# Patient Record
Sex: MALE | Race: WHITE | NOT HISPANIC OR LATINO | URBAN - METROPOLITAN AREA
[De-identification: names, ages, dates, MRNs, and addresses within clinical notes are randomized per-mention and may not be internally consistent; named-entity substitution may affect disease eponyms.]

---

## 2019-11-25 ENCOUNTER — INPATIENT (INPATIENT)
Facility: HOSPITAL | Age: 39
LOS: 0 days | Discharge: HOME | DRG: 343 | End: 2019-11-26
Attending: SURGERY | Admitting: SURGERY
Payer: COMMERCIAL

## 2019-11-25 VITALS
RESPIRATION RATE: 18 BRPM | WEIGHT: 149.91 LBS | OXYGEN SATURATION: 98 % | TEMPERATURE: 98 F | SYSTOLIC BLOOD PRESSURE: 135 MMHG | HEART RATE: 101 BPM | HEIGHT: 69 IN | DIASTOLIC BLOOD PRESSURE: 88 MMHG

## 2019-11-25 LAB
ALBUMIN SERPL ELPH-MCNC: 4.1 G/DL — SIGNIFICANT CHANGE UP (ref 3.4–5)
ALP SERPL-CCNC: 83 U/L — SIGNIFICANT CHANGE UP (ref 40–120)
ALT FLD-CCNC: 42 U/L — SIGNIFICANT CHANGE UP (ref 12–42)
ANION GAP SERPL CALC-SCNC: 10 MMOL/L — SIGNIFICANT CHANGE UP (ref 9–16)
APPEARANCE UR: CLEAR — SIGNIFICANT CHANGE UP
APTT BLD: 32.4 SEC — SIGNIFICANT CHANGE UP (ref 27.5–36.3)
AST SERPL-CCNC: 27 U/L — SIGNIFICANT CHANGE UP (ref 15–37)
BILIRUB SERPL-MCNC: 0.6 MG/DL — SIGNIFICANT CHANGE UP (ref 0.2–1.2)
BILIRUB UR-MCNC: NEGATIVE — SIGNIFICANT CHANGE UP
BUN SERPL-MCNC: 11 MG/DL — SIGNIFICANT CHANGE UP (ref 7–23)
CALCIUM SERPL-MCNC: 9.5 MG/DL — SIGNIFICANT CHANGE UP (ref 8.5–10.5)
CHLORIDE SERPL-SCNC: 106 MMOL/L — SIGNIFICANT CHANGE UP (ref 96–108)
CO2 SERPL-SCNC: 29 MMOL/L — SIGNIFICANT CHANGE UP (ref 22–31)
COLOR SPEC: YELLOW — SIGNIFICANT CHANGE UP
CREAT SERPL-MCNC: 0.81 MG/DL — SIGNIFICANT CHANGE UP (ref 0.5–1.3)
DIFF PNL FLD: NEGATIVE — SIGNIFICANT CHANGE UP
GLUCOSE SERPL-MCNC: 108 MG/DL — HIGH (ref 70–99)
GLUCOSE UR QL: NEGATIVE — SIGNIFICANT CHANGE UP
HCT VFR BLD CALC: 44.4 % — SIGNIFICANT CHANGE UP (ref 39–50)
HGB BLD-MCNC: 14 G/DL — SIGNIFICANT CHANGE UP (ref 13–17)
INR BLD: 1.06 — SIGNIFICANT CHANGE UP (ref 0.88–1.16)
KETONES UR-MCNC: NEGATIVE — SIGNIFICANT CHANGE UP
LEUKOCYTE ESTERASE UR-ACNC: NEGATIVE — SIGNIFICANT CHANGE UP
LIDOCAIN IGE QN: 128 U/L — SIGNIFICANT CHANGE UP (ref 73–393)
MCHC RBC-ENTMCNC: 26.3 PG — LOW (ref 27–34)
MCHC RBC-ENTMCNC: 31.5 GM/DL — LOW (ref 32–36)
MCV RBC AUTO: 83.5 FL — SIGNIFICANT CHANGE UP (ref 80–100)
NITRITE UR-MCNC: NEGATIVE — SIGNIFICANT CHANGE UP
NRBC # BLD: 0 /100 WBCS — SIGNIFICANT CHANGE UP (ref 0–0)
PH UR: 6 — SIGNIFICANT CHANGE UP (ref 5–8)
PLATELET # BLD AUTO: 214 K/UL — SIGNIFICANT CHANGE UP (ref 150–400)
POTASSIUM SERPL-MCNC: 3.9 MMOL/L — SIGNIFICANT CHANGE UP (ref 3.5–5.3)
POTASSIUM SERPL-SCNC: 3.9 MMOL/L — SIGNIFICANT CHANGE UP (ref 3.5–5.3)
PROT SERPL-MCNC: 7.5 G/DL — SIGNIFICANT CHANGE UP (ref 6.4–8.2)
PROT UR-MCNC: NEGATIVE MG/DL — SIGNIFICANT CHANGE UP
PROTHROM AB SERPL-ACNC: 11.7 SEC — SIGNIFICANT CHANGE UP (ref 10–12.9)
RBC # BLD: 5.32 M/UL — SIGNIFICANT CHANGE UP (ref 4.2–5.8)
RBC # FLD: 14.5 % — SIGNIFICANT CHANGE UP (ref 10.3–14.5)
SODIUM SERPL-SCNC: 145 MMOL/L — SIGNIFICANT CHANGE UP (ref 132–145)
SP GR SPEC: <=1.005 — SIGNIFICANT CHANGE UP (ref 1–1.03)
UROBILINOGEN FLD QL: 0.2 E.U./DL — SIGNIFICANT CHANGE UP
WBC # BLD: 7.29 K/UL — SIGNIFICANT CHANGE UP (ref 3.8–10.5)
WBC # FLD AUTO: 7.29 K/UL — SIGNIFICANT CHANGE UP (ref 3.8–10.5)

## 2019-11-25 PROCEDURE — 74177 CT ABD & PELVIS W/CONTRAST: CPT | Mod: 26

## 2019-11-25 PROCEDURE — 99285 EMERGENCY DEPT VISIT HI MDM: CPT

## 2019-11-25 RX ORDER — ACETAMINOPHEN 500 MG
650 TABLET ORAL ONCE
Refills: 0 | Status: COMPLETED | OUTPATIENT
Start: 2019-11-25 | End: 2019-11-25

## 2019-11-25 RX ORDER — SODIUM CHLORIDE 9 MG/ML
1000 INJECTION INTRAMUSCULAR; INTRAVENOUS; SUBCUTANEOUS ONCE
Refills: 0 | Status: COMPLETED | OUTPATIENT
Start: 2019-11-25 | End: 2019-11-25

## 2019-11-25 RX ORDER — IOHEXOL 300 MG/ML
30 INJECTION, SOLUTION INTRAVENOUS ONCE
Refills: 0 | Status: COMPLETED | OUTPATIENT
Start: 2019-11-25 | End: 2019-11-25

## 2019-11-25 RX ADMIN — SODIUM CHLORIDE 500 MILLILITER(S): 9 INJECTION INTRAMUSCULAR; INTRAVENOUS; SUBCUTANEOUS at 21:26

## 2019-11-25 RX ADMIN — Medication 650 MILLIGRAM(S): at 20:39

## 2019-11-25 RX ADMIN — SODIUM CHLORIDE 1000 MILLILITER(S): 9 INJECTION INTRAMUSCULAR; INTRAVENOUS; SUBCUTANEOUS at 16:12

## 2019-11-25 RX ADMIN — IOHEXOL 30 MILLILITER(S): 300 INJECTION, SOLUTION INTRAVENOUS at 16:11

## 2019-11-25 NOTE — ED ADULT NURSE NOTE - OBJECTIVE STATEMENT
Patient sent by pmd for right abdominal pain r/o appendicitis , denies any nausea, vomiting or diarrhea

## 2019-11-25 NOTE — ED ADULT NURSE NOTE - NSIMPLEMENTINTERV_GEN_ALL_ED
Implemented All Universal Safety Interventions:  [place] to call system. Call bell, personal items and telephone within reach. Instruct patient to call for assistance. Room bathroom lighting operational. Non-slip footwear when patient is off stretcher. Physically safe environment: no spills, clutter or unnecessary equipment. Stretcher in lowest position, wheels locked, appropriate side rails in place.

## 2019-11-25 NOTE — ED PROVIDER NOTE - CHIEF COMPLAINT
The patient is a 39y Male complaining of abdominal pain. Advancement Flap (Double) Text: The defect edges were debeveled with a #15 scalpel blade.  Given the location of the defect and the proximity to free margins a double advancement flap was deemed most appropriate.  Using a sterile surgical marker, the appropriate advancement flaps were drawn incorporating the defect and placing the expected incisions within the relaxed skin tension lines where possible.    The area thus outlined was incised deep to adipose tissue with a #15 scalpel blade.  The skin margins were undermined to an appropriate distance in all directions utilizing iris scissors.

## 2019-11-25 NOTE — ED PROVIDER NOTE - OBJECTIVE STATEMENT
40 y/o otherwise healthy M, allergic to penicillin, sent to ED by PCP for R sided abd pain to r/o appendicitis. Denies any N/V/D, fever, or chills. Follows up w Dr Lee (PCP).

## 2019-11-25 NOTE — ED PROVIDER NOTE - CLINICAL SUMMARY MEDICAL DECISION MAKING FREE TEXT BOX
38 y/o M w RLQ TTP. Will hydrate, obtain basic labds and CT abd w contrast to r/o appendicitis. 40 y/o M w RLQ TTP. Will hydrate, obtain basic labds and CT abd w contrast.  Tip of appendix does not fill with contrast and radiologist sees inflammatory changes, will admit to Dr Stringer surgeon on call through [facility]

## 2019-11-25 NOTE — ED ADULT NURSE REASSESSMENT NOTE - NS ED NURSE REASSESS COMMENT FT1
Pt laying in bed c/o HA. Acetaminophen administered. Pt states that abdn pn is improving. Pt waiting on CT Scan Results. Side rails up, safety is maintained.

## 2019-11-26 VITALS
RESPIRATION RATE: 18 BRPM | SYSTOLIC BLOOD PRESSURE: 124 MMHG | DIASTOLIC BLOOD PRESSURE: 85 MMHG | TEMPERATURE: 98 F | HEART RATE: 92 BPM | OXYGEN SATURATION: 98 %

## 2019-11-26 LAB
ALBUMIN SERPL ELPH-MCNC: 4.3 G/DL — SIGNIFICANT CHANGE UP (ref 3.3–5)
ALP SERPL-CCNC: 70 U/L — SIGNIFICANT CHANGE UP (ref 40–120)
ALT FLD-CCNC: 27 U/L — SIGNIFICANT CHANGE UP (ref 10–45)
ANION GAP SERPL CALC-SCNC: 11 MMOL/L — SIGNIFICANT CHANGE UP (ref 5–17)
AST SERPL-CCNC: 22 U/L — SIGNIFICANT CHANGE UP (ref 10–40)
BILIRUB SERPL-MCNC: 0.4 MG/DL — SIGNIFICANT CHANGE UP (ref 0.2–1.2)
BUN SERPL-MCNC: 6 MG/DL — LOW (ref 7–23)
CALCIUM SERPL-MCNC: 8.8 MG/DL — SIGNIFICANT CHANGE UP (ref 8.4–10.5)
CHLORIDE SERPL-SCNC: 106 MMOL/L — SIGNIFICANT CHANGE UP (ref 96–108)
CO2 SERPL-SCNC: 23 MMOL/L — SIGNIFICANT CHANGE UP (ref 22–31)
CREAT SERPL-MCNC: 0.76 MG/DL — SIGNIFICANT CHANGE UP (ref 0.5–1.3)
GLUCOSE SERPL-MCNC: 135 MG/DL — HIGH (ref 70–99)
HCT VFR BLD CALC: 43.1 % — SIGNIFICANT CHANGE UP (ref 39–50)
HGB BLD-MCNC: 13.6 G/DL — SIGNIFICANT CHANGE UP (ref 13–17)
MAGNESIUM SERPL-MCNC: 2 MG/DL — SIGNIFICANT CHANGE UP (ref 1.6–2.6)
MCHC RBC-ENTMCNC: 26.4 PG — LOW (ref 27–34)
MCHC RBC-ENTMCNC: 31.6 GM/DL — LOW (ref 32–36)
MCV RBC AUTO: 83.7 FL — SIGNIFICANT CHANGE UP (ref 80–100)
NRBC # BLD: 0 /100 WBCS — SIGNIFICANT CHANGE UP (ref 0–0)
PHOSPHATE SERPL-MCNC: 2.1 MG/DL — LOW (ref 2.5–4.5)
PLATELET # BLD AUTO: 226 K/UL — SIGNIFICANT CHANGE UP (ref 150–400)
POTASSIUM SERPL-MCNC: 4.4 MMOL/L — SIGNIFICANT CHANGE UP (ref 3.5–5.3)
POTASSIUM SERPL-SCNC: 4.4 MMOL/L — SIGNIFICANT CHANGE UP (ref 3.5–5.3)
PROT SERPL-MCNC: 7.2 G/DL — SIGNIFICANT CHANGE UP (ref 6–8.3)
RBC # BLD: 5.15 M/UL — SIGNIFICANT CHANGE UP (ref 4.2–5.8)
RBC # FLD: 14.5 % — SIGNIFICANT CHANGE UP (ref 10.3–14.5)
SODIUM SERPL-SCNC: 140 MMOL/L — SIGNIFICANT CHANGE UP (ref 135–145)
WBC # BLD: 8.07 K/UL — SIGNIFICANT CHANGE UP (ref 3.8–10.5)
WBC # FLD AUTO: 8.07 K/UL — SIGNIFICANT CHANGE UP (ref 3.8–10.5)

## 2019-11-26 PROCEDURE — 88304 TISSUE EXAM BY PATHOLOGIST: CPT | Mod: 26

## 2019-11-26 PROCEDURE — 88302 TISSUE EXAM BY PATHOLOGIST: CPT | Mod: 26

## 2019-11-26 RX ORDER — ONDANSETRON 8 MG/1
4 TABLET, FILM COATED ORAL EVERY 6 HOURS
Refills: 0 | Status: DISCONTINUED | OUTPATIENT
Start: 2019-11-26 | End: 2019-11-26

## 2019-11-26 RX ORDER — HEPARIN SODIUM 5000 [USP'U]/ML
5000 INJECTION INTRAVENOUS; SUBCUTANEOUS EVERY 8 HOURS
Refills: 0 | Status: DISCONTINUED | OUTPATIENT
Start: 2019-11-26 | End: 2019-11-26

## 2019-11-26 RX ORDER — HYDROMORPHONE HYDROCHLORIDE 2 MG/ML
0.5 INJECTION INTRAMUSCULAR; INTRAVENOUS; SUBCUTANEOUS EVERY 4 HOURS
Refills: 0 | Status: DISCONTINUED | OUTPATIENT
Start: 2019-11-26 | End: 2019-11-26

## 2019-11-26 RX ORDER — SODIUM,POTASSIUM PHOSPHATES 278-250MG
1 POWDER IN PACKET (EA) ORAL ONCE
Refills: 0 | Status: COMPLETED | OUTPATIENT
Start: 2019-11-26 | End: 2019-11-26

## 2019-11-26 RX ORDER — BUPIVACAINE 13.3 MG/ML
20 INJECTION, SUSPENSION, LIPOSOMAL INFILTRATION ONCE
Refills: 0 | Status: DISCONTINUED | OUTPATIENT
Start: 2019-11-26 | End: 2019-11-26

## 2019-11-26 RX ORDER — KETOROLAC TROMETHAMINE 30 MG/ML
15 SYRINGE (ML) INJECTION EVERY 6 HOURS
Refills: 0 | Status: DISCONTINUED | OUTPATIENT
Start: 2019-11-26 | End: 2019-11-26

## 2019-11-26 RX ORDER — OXYCODONE AND ACETAMINOPHEN 5; 325 MG/1; MG/1
1 TABLET ORAL EVERY 6 HOURS
Refills: 0 | Status: DISCONTINUED | OUTPATIENT
Start: 2019-11-26 | End: 2019-11-26

## 2019-11-26 RX ORDER — OXYCODONE AND ACETAMINOPHEN 5; 325 MG/1; MG/1
2 TABLET ORAL EVERY 6 HOURS
Refills: 0 | Status: DISCONTINUED | OUTPATIENT
Start: 2019-11-26 | End: 2019-11-26

## 2019-11-26 RX ORDER — SODIUM CHLORIDE 9 MG/ML
1000 INJECTION, SOLUTION INTRAVENOUS
Refills: 0 | Status: DISCONTINUED | OUTPATIENT
Start: 2019-11-26 | End: 2019-11-26

## 2019-11-26 RX ORDER — HYDROMORPHONE HYDROCHLORIDE 2 MG/ML
1 INJECTION INTRAMUSCULAR; INTRAVENOUS; SUBCUTANEOUS EVERY 4 HOURS
Refills: 0 | Status: DISCONTINUED | OUTPATIENT
Start: 2019-11-26 | End: 2019-11-26

## 2019-11-26 RX ADMIN — Medication 15 MILLIGRAM(S): at 13:56

## 2019-11-26 RX ADMIN — Medication 15 MILLIGRAM(S): at 06:35

## 2019-11-26 RX ADMIN — HEPARIN SODIUM 5000 UNIT(S): 5000 INJECTION INTRAVENOUS; SUBCUTANEOUS at 10:08

## 2019-11-26 RX ADMIN — Medication 1 PACKET(S): at 09:01

## 2019-11-26 RX ADMIN — SODIUM CHLORIDE 100 MILLILITER(S): 9 INJECTION, SOLUTION INTRAVENOUS at 02:27

## 2019-11-26 RX ADMIN — Medication 15 MILLIGRAM(S): at 07:56

## 2019-11-26 RX ADMIN — Medication 15 MILLIGRAM(S): at 13:29

## 2019-11-26 NOTE — DISCHARGE NOTE PROVIDER - INSTRUCTIONS
please continue to eat a clear liquid diet until you resume having regular bowel function then slowly advance your diet.

## 2019-11-26 NOTE — PROGRESS NOTE ADULT - ASSESSMENT
40 y/o no PMH presents with R sided pain now s/p Lap Appendectomy     CLD/LR @ 100  Pain/nausea control  HSQ/ SCDs  possible discharge later today

## 2019-11-26 NOTE — PROGRESS NOTE ADULT - SUBJECTIVE AND OBJECTIVE BOX
INTERVAL HPI/OVERNIGHT EVENTS:   SURGERY ATTENDING    STATUS POST:  Laparoscopic appendectomy, repair of umbilical hernia no mesh     POST OPERATIVE DAY #: 0    SUBJECTIVE:  Flatus: [x ] YES [ ] NO             Bowel Movement: [ ] YES [x ] NO  Pain (0-10):       1     Pain Control Adequate: [x ] YES [ ] NO  Nausea: [ ] YES [x ] NO            Vomiting: [ ] YES [x ] NO  Diarrhea: [ ] YES [x ] NO         Constipation: [ ] YES [x ] NO     Chest Pain: [ ] YES [x ] NO    SOB:  [ ] YES [x ] NO    MEDICATIONS  (STANDING):  BUpivacaine liposome 1.3% Injectable (no eMAR) 20 milliLiter(s) Local Injection once  heparin  Injectable 5000 Unit(s) SubCutaneous every 8 hours  ketorolac   Injectable 15 milliGRAM(s) IV Push every 6 hours  lactated ringers. 1000 milliLiter(s) (100 mL/Hr) IV Continuous <Continuous>    MEDICATIONS  (PRN):  ondansetron Injectable 4 milliGRAM(s) IV Push every 6 hours PRN Nausea  oxycodone    5 mG/acetaminophen 325 mG 1 Tablet(s) Oral every 6 hours PRN Moderate Pain (4 - 6)  oxycodone    5 mG/acetaminophen 325 mG 2 Tablet(s) Oral every 6 hours PRN Severe Pain (7 - 10)      Vital Signs Last 24 Hrs  T(C): 36.7 (26 Nov 2019 14:33), Max: 37.1 (26 Nov 2019 00:40)  T(F): 98.1 (26 Nov 2019 14:33), Max: 98.8 (26 Nov 2019 00:40)  HR: 92 (26 Nov 2019 14:33) (80 - 96)  BP: 124/85 (26 Nov 2019 14:33) (117/64 - 153/93)  BP(mean): --  RR: 18 (26 Nov 2019 14:33) (14 - 18)  SpO2: 98% (26 Nov 2019 14:33) (97% - 100%)    PHYSICAL EXAM:      Constitutional:    Eyes:    ENMT:    Neck:    Breasts:    Back:    Respiratory:    Cardiovascular:    Gastrointestinal:    Genitourinary:    Rectal:    Extremities:    Vascular:    Neurological:    Skin:    Lymph Nodes:    Musculoskeletal:    Psychiatric:        I&O's Detail    25 Nov 2019 07:01  -  26 Nov 2019 07:00  --------------------------------------------------------  IN:    lactated ringers.: 1120 mL  Total IN: 1120 mL    OUT:    Voided: 1400 mL  Total OUT: 1400 mL    Total NET: -280 mL      26 Nov 2019 07:01  -  26 Nov 2019 18:37  --------------------------------------------------------  IN:    lactated ringers.: 800 mL    Oral Fluid: 600 mL  Total IN: 1400 mL    OUT:    Voided: 1400 mL  Total OUT: 1400 mL    Total NET: 0 mL          LABS:                        13.6   8.07  )-----------( 226      ( 26 Nov 2019 06:11 )             43.1     11-26    140  |  106  |  6<L>  ----------------------------<  135<H>  4.4   |  23  |  0.76    Ca    8.8      26 Nov 2019 06:11  Phos  2.1     11-26  Mg     2.0     11-26    TPro  7.2  /  Alb  4.3  /  TBili  0.4  /  DBili  x   /  AST  22  /  ALT  27  /  AlkPhos  70  11-26    PT/INR - ( 25 Nov 2019 16:14 )   PT: 11.7 sec;   INR: 1.06          PTT - ( 25 Nov 2019 16:14 )  PTT:32.4 sec  Urinalysis Basic - ( 25 Nov 2019 16:27 )    Color: Yellow / Appearance: Clear / SG: <=1.005 / pH: x  Gluc: x / Ketone: NEGATIVE  / Bili: NEGATIVE / Urobili: 0.2 E.U./dL   Blood: x / Protein: NEGATIVE mg/dL / Nitrite: NEGATIVE   Leuk Esterase: NEGATIVE / RBC: x / WBC x   Sq Epi: x / Non Sq Epi: x / Bacteria: x        RADIOLOGY & ADDITIONAL STUDIES:

## 2019-11-26 NOTE — PROGRESS NOTE ADULT - ATTENDING COMMENTS
Abdomen soft, BS+, Flatus+, BM-, wounds dry, clean, intact, tolerating diet, D/C home with F/U in the office.

## 2019-11-26 NOTE — BRIEF OPERATIVE NOTE - OPERATION/FINDINGS
Preop Dx: Acute Appendicitis  Postop Dx: Same as above, Umbilical Hernia  Procedure: Laparoscopic Appendectomy, repair of umbilical hernia  Findings: Abdomen accessed via open cutdown through umbilical hernia. Inflamed tip of appendix noted. Mesoappendix taken with ligasure device. Appendix taken with 0-PDS endoloop x2. Umbilical hernia repaired primarily with 3x 0-Maxxon sutures.

## 2019-11-26 NOTE — PROGRESS NOTE ADULT - SUBJECTIVE AND OBJECTIVE BOX
POST-OPERATIVE NOTE    Procedure: Lap Appendectomy    Diagnosis/Indication: Acute Appendicitis    Surgeon: Dr. Stringer     S: Pt has no complaints. Denies CP, SOB, ELMORE, calf tenderness. Pain controlled with medication. Denies nausea, vomiting.    O:  T(C): 36.8 (11-26-19 @ 03:07), Max: 36.8 (11-26-19 @ 00:10)  T(F): 98.2 (11-26-19 @ 03:07), Max: 98.2 (11-26-19 @ 00:10)  HR: 88 (11-26-19 @ 03:07) (80 - 96)  BP: 141/98 (11-26-19 @ 03:07) (140/94 - 149/96)  RR: 16 (11-26-19 @ 03:07) (14 - 17)  SpO2: 99% (11-26-19 @ 03:07) (99% - 100%)  Wt(kg): --                        14.0   7.29  )-----------( 214      ( 25 Nov 2019 16:14 )             44.4     11-25    145  |  106  |  11  ----------------------------<  108<H>  3.9   |  29  |  0.81    Ca    9.5      25 Nov 2019 16:14    TPro  7.5  /  Alb  4.1  /  TBili  0.6  /  DBili  x   /  AST  27  /  ALT  42  /  AlkPhos  83  11-25      Gen: NAD, resting comfortably in bed  C/V: NSR  Pulm: Nonlabored breathing, no respiratory distress  Abd: soft, non-distended, appropriate tenderness at incisions   Extrem: WWP, no calf edema or tenderness, SCDs in place

## 2019-11-26 NOTE — DISCHARGE NOTE NURSING/CASE MANAGEMENT/SOCIAL WORK - PATIENT PORTAL LINK FT
You can access the FollowMyHealth Patient Portal offered by [facility] by registering at the following website: [email or web address]. By joining [employer]’s FollowMyHealth portal, you will also be able to view your health information using other applications (apps) compatible with our system.

## 2019-11-26 NOTE — PROGRESS NOTE ADULT - SUBJECTIVE AND OBJECTIVE BOX
SUBJECTIVE: Patient seen and examined bedside. Patient reports that his pain is controlled. Patient is passing flatus and tolerating clear liquid diet.    heparin  Injectable 5000 Unit(s) SubCutaneous every 8 hours      Vital Signs Last 24 Hrs  T(C): 36.8 (26 Nov 2019 08:36), Max: 37.1 (26 Nov 2019 00:40)  T(F): 98.3 (26 Nov 2019 08:36), Max: 98.8 (26 Nov 2019 00:40)  HR: 87 (26 Nov 2019 08:36) (80 - 101)  BP: 127/81 (26 Nov 2019 08:36) (117/64 - 153/93)  BP(mean): --  RR: 17 (26 Nov 2019 08:36) (14 - 18)  SpO2: 97% (26 Nov 2019 08:36) (97% - 100%)  I&O's Detail    25 Nov 2019 07:01  -  26 Nov 2019 07:00  --------------------------------------------------------  IN:    lactated ringers.: 1120 mL  Total IN: 1120 mL    OUT:    Voided: 1400 mL  Total OUT: 1400 mL    Total NET: -280 mL      26 Nov 2019 07:01  -  26 Nov 2019 09:29  --------------------------------------------------------  IN:    lactated ringers.: 200 mL  Total IN: 200 mL    OUT:  Total OUT: 0 mL    Total NET: 200 mL          General: NAD, resting comfortably in bed  C/V: NSR  Pulm: Nonlabored breathing, no respiratory distress  Abd: soft, nontender, nondistended, surgical incisions clean, dry ,and intact.  Extrem: WWP, no edema, SCDs in place        LABS:                        13.6   8.07  )-----------( 226      ( 26 Nov 2019 06:11 )             43.1     11-26    140  |  106  |  6<L>  ----------------------------<  135<H>  4.4   |  23  |  0.76    Ca    8.8      26 Nov 2019 06:11  Phos  2.1     11-26  Mg     2.0     11-26    TPro  7.2  /  Alb  4.3  /  TBili  0.4  /  DBili  x   /  AST  22  /  ALT  27  /  AlkPhos  70  11-26    PT/INR - ( 25 Nov 2019 16:14 )   PT: 11.7 sec;   INR: 1.06          PTT - ( 25 Nov 2019 16:14 )  PTT:32.4 sec  Urinalysis Basic - ( 25 Nov 2019 16:27 )    Color: Yellow / Appearance: Clear / SG: <=1.005 / pH: x  Gluc: x / Ketone: NEGATIVE  / Bili: NEGATIVE / Urobili: 0.2 E.U./dL   Blood: x / Protein: NEGATIVE mg/dL / Nitrite: NEGATIVE   Leuk Esterase: NEGATIVE / RBC: x / WBC x   Sq Epi: x / Non Sq Epi: x / Bacteria: x        RADIOLOGY & ADDITIONAL STUDIES:

## 2019-11-26 NOTE — DISCHARGE NOTE PROVIDER - HOSPITAL COURSE
40 yo M with RLQ abdominal pain with CT evidence of acute appendicitis. The patient went to the OR for laparoscopic appendectomy on 11/26. The patient tolerated procedure well and there were no complications. Post operatively the patient passed his trial of void, pain is controlled, and tolerating clear liquid diet.

## 2019-11-26 NOTE — DISCHARGE NOTE PROVIDER - NSDCCPCAREPLAN_GEN_ALL_CORE_FT
PRINCIPAL DISCHARGE DIAGNOSIS  Diagnosis: Appendicitis  Assessment and Plan of Treatment: Please resume all regular home medications unless specifically advised not to take a particular medication.  Please take pain medication as prescribed for severe pain as needed.   Please get plenty of rest, continue to ambulate several times per day, and drink adequate amounts of fluids. Avoid lifting weights greater than 5-10 lbs until you follow-up with your surgeon, who will instruct you further regarding activity restrictions.  Avoid driving or operating heavy machinery while taking pain medications.   Please follow-up with your surgeon Dr. Stringer in 2 weeks. Please call his office at [phone] to schedule an appointment.

## 2019-11-26 NOTE — DISCHARGE NOTE PROVIDER - CARE PROVIDER_API CALL
Brie Stringer (MD)  Surgery  [address], [address]  [place], [place] [address]  Phone: [phone]  Fax: [phone]  Follow Up Time:

## 2019-11-26 NOTE — DISCHARGE NOTE PROVIDER - NSDCFUADDINST_GEN_ALL_CORE_FT
Please call your doctor or nurse practitioner if you experience the following: You experience new chest pain, pressure, squeezing or tightness., New or worsening cough, shortness of breath, or wheeze. If you are vomiting and cannot keep down fluids or your medications. You are getting dehydrated due to continued vomiting, diarrhea, or other reasons. Signs of dehydration include dry mouth, rapid heartbeat, or feeling dizzy or faint when standing.You see blood or dark/black material when you vomit or have a bowel movement.  You experience burning when you urinate, have blood in your urine, or experience a discharge.  Your pain is not improving within 8-12 hours or is not gone within 24 hours. Call or return immediately if your pain is getting worse, changes location, or moves to your chest or back.  You have shaking chills, or fever greater than 101.5 degrees Fahrenheit or 38 degrees Celsius.  Any change in your symptoms, or any new symptoms that concern you.

## 2019-12-01 DIAGNOSIS — K35.80 UNSPECIFIED ACUTE APPENDICITIS: ICD-10-CM

## 2019-12-01 DIAGNOSIS — K42.9 UMBILICAL HERNIA WITHOUT OBSTRUCTION OR GANGRENE: ICD-10-CM

## 2019-12-03 LAB — SURGICAL PATHOLOGY STUDY: SIGNIFICANT CHANGE UP

## 2019-12-10 PROCEDURE — 99285 EMERGENCY DEPT VISIT HI MDM: CPT | Mod: 25

## 2019-12-10 PROCEDURE — 83735 ASSAY OF MAGNESIUM: CPT

## 2019-12-10 PROCEDURE — 85027 COMPLETE CBC AUTOMATED: CPT

## 2019-12-10 PROCEDURE — 84100 ASSAY OF PHOSPHORUS: CPT

## 2019-12-10 PROCEDURE — 80053 COMPREHEN METABOLIC PANEL: CPT

## 2019-12-10 PROCEDURE — 36415 COLL VENOUS BLD VENIPUNCTURE: CPT

## 2019-12-10 PROCEDURE — 74177 CT ABD & PELVIS W/CONTRAST: CPT

## 2019-12-10 PROCEDURE — 88302 TISSUE EXAM BY PATHOLOGIST: CPT

## 2019-12-10 PROCEDURE — 88304 TISSUE EXAM BY PATHOLOGIST: CPT

## 2020-12-31 NOTE — PATIENT PROFILE ADULT - EQUIPMENT CURRENTLY USED AT HOME
Pt ambulatory out with [facility] and [employer].       Yoshi Mari, RN  12/31/20 0510 no

## 2021-12-22 NOTE — PATIENT PROFILE ADULT - NSPROPASSIVESMOKEEXPOSURE_GEN_A_NUR
What Type Of Note Output Would You Prefer (Optional)?: Standard Output How Severe Is Your Acne?: mild Is This A New Presentation, Or A Follow-Up?: Acne No

## 2022-11-15 NOTE — PRE-OP CHECKLIST - ISOLATION PRECAUTIONS
Patient asked for lorazepam I dont want him taking it tid I think he asked for the refill too soon    Please clarify   none

## 2023-04-25 NOTE — ED ADULT NURSE NOTE - CAS EDN DISCHARGE ASSESSMENT
Department of Orthopedic Surgery  Consult Note    Reason for Consult: Left knee pain    HISTORY OF PRESENT ILLNESS:       Patient is a 48 y.o. male who presents with left knee pain patient was initially seen at [facility] orthopedic urgent care and was sent to the emergency department. He has received no antibiotics. .  Patient states that 6- he had open reduction internal fixation of the left bicondylar tibial plateau fracture with Dr. Jovanny Lott. Patient states he was doing well until approximately 2 months ago when he noted his left knee began to swell. He states he was still able to ambulate on his left lower extremity however he has had a limp since his surgery. He currently does have pain in his left knee with full extension, he states he is not able to fully extend the left knee. However he does believe most of his discomfort is coming from the swelling. patient does live at home with a roommate. Patient states that over the last month he has noted drainage from his anterior pin site from his external fixator. The patient denies any fever or chills over the last 2 months. Denies numbness/tingling/paresthesias. Denies any other orthopedic complaints at this time. Patient denies any use of blood thinners. Patient does use smokeless chewing tobacco.  Past Medical History:    History reviewed. No pertinent past medical history.   Past Surgical History:        Procedure Laterality Date    FRACTURE SURGERY      Left tibal    HERNIA REPAIR      KNEE SURGERY Left 4/19/2023    LEFT KNEE IRRIGATION AND DEBRIDEMENT, LEFT TIBIA HARDWARE REMOVAL performed by Yancy Velez MD at [address] [place] [address] Left 6/6/2021    LEG EXTERNAL FIXATOR APPLICATION, LEFT performed by Yvan Harvey MD at [address] [place] [address] 6/24/2021    REMOVAL OF LEFT LOWER EXTREMITY EXTERNAL FIXATOR,  OPEN REDUCTION INTERNAL FIXATION LEFT TIBIAL PLATEAU-  SYNTHES / GEORGE performed by Yancy Velez MD at [facility]
Alert and oriented to person, place and time

## 2023-11-16 NOTE — PATIENT PROFILE ADULT - DO YOU FEEL LIKE HURTING YOURSELF OR OTHERS?
Mom informed medication refill completed and sent to [facility] pharmacy. Mom verbalized understanding.    no